# Patient Record
Sex: FEMALE | Race: WHITE | Employment: FULL TIME | URBAN - METROPOLITAN AREA
[De-identification: names, ages, dates, MRNs, and addresses within clinical notes are randomized per-mention and may not be internally consistent; named-entity substitution may affect disease eponyms.]

---

## 2017-02-12 RX ORDER — LEVOTHYROXINE SODIUM 0.15 MG/1
150 TABLET ORAL DAILY
Qty: 90 TABLET | Refills: 1 | Status: SHIPPED | OUTPATIENT
Start: 2017-02-12 | End: 2017-03-10 | Stop reason: SDUPTHER

## 2017-02-27 RX ORDER — METFORMIN HYDROCHLORIDE 500 MG/1
TABLET, EXTENDED RELEASE ORAL
Qty: 150 TABLET | Refills: 5 | Status: SHIPPED | OUTPATIENT
Start: 2017-02-27

## 2017-03-10 RX ORDER — LEVOTHYROXINE SODIUM 0.15 MG/1
150 TABLET ORAL DAILY
Qty: 90 TABLET | Refills: 3 | Status: SHIPPED | OUTPATIENT
Start: 2017-03-10

## 2017-03-10 RX ORDER — IRON POLYSACCHARIDE COMPLEX 180 MG
1 CAPSULE ORAL DAILY
Qty: 90 CAPSULE | Refills: 3 | Status: SHIPPED | OUTPATIENT
Start: 2017-03-10 | End: 2017-09-06

## 2017-03-10 RX ORDER — CELECOXIB 200 MG/1
200 CAPSULE ORAL 2 TIMES DAILY
Qty: 180 CAPSULE | Refills: 3 | Status: SHIPPED | OUTPATIENT
Start: 2017-03-10

## 2017-03-10 RX ORDER — ERGOCALCIFEROL 1.25 MG/1
CAPSULE ORAL
Qty: 12 CAPSULE | Refills: 3 | Status: SHIPPED | OUTPATIENT
Start: 2017-03-10

## 2017-06-16 ENCOUNTER — TELEPHONE (OUTPATIENT)
Dept: ENDOCRINOLOGY | Age: 47
End: 2017-06-16

## 2017-06-16 RX ORDER — METFORMIN HCL 100 %
POWDER (GRAM) MISCELLANEOUS
Qty: 180 G | Refills: 0 | Status: SHIPPED | OUTPATIENT
Start: 2017-06-16 | End: 2017-06-16 | Stop reason: ALTCHOICE

## 2017-09-05 ENCOUNTER — OFFICE VISIT (OUTPATIENT)
Dept: ORTHOPEDIC SURGERY | Age: 47
End: 2017-09-05

## 2017-09-05 VITALS
HEIGHT: 64 IN | HEART RATE: 65 BPM | DIASTOLIC BLOOD PRESSURE: 69 MMHG | BODY MASS INDEX: 28.17 KG/M2 | SYSTOLIC BLOOD PRESSURE: 115 MMHG | WEIGHT: 165 LBS

## 2017-09-05 DIAGNOSIS — M25.562 LEFT KNEE PAIN, UNSPECIFIED CHRONICITY: Primary | ICD-10-CM

## 2017-09-05 DIAGNOSIS — Z96.652 HISTORY OF TOTAL KNEE ARTHROPLASTY, LEFT: ICD-10-CM

## 2017-09-05 PROCEDURE — 99203 OFFICE O/P NEW LOW 30 MIN: CPT | Performed by: ORTHOPAEDIC SURGERY

## 2017-09-05 PROCEDURE — 73562 X-RAY EXAM OF KNEE 3: CPT | Performed by: ORTHOPAEDIC SURGERY

## 2017-09-08 ENCOUNTER — HOSPITAL ENCOUNTER (OUTPATIENT)
Dept: PHYSICAL THERAPY | Age: 47
Discharge: HOME | End: 2017-09-30
Admitting: ORTHOPAEDIC SURGERY

## 2017-09-08 NOTE — FLOWSHEET NOTE
[facility]  Orthopaedics and Sports Rehabilitation, Alexia Harper    Physical Therapy Daily Treatment Note  Date:  2017    Patient Name:  Henna Pate    :  1970  MRN: [id number]  Restrictions/Precautions:    Medical/Treatment Diagnosis Information:  Diagnosis: L knee pain//  ICD 10 Code: M25.562D  Treatment Diagnosis: Knee pain/ knee swelling/ limited ROM/ LE muscle weakness    Insurance/Certification information:  PT Insurance Information: PT BENEFITS 2017 FACILITY/ ANTHEM/ EFFECTIVE 7-1-15 ACTIVE/ DED 1500/ OOP 2500/ PAYS 85%/ 60 VPPY/ 0 AUTH REQ/ ANASTASIAY/ REF# [id number]/ 17 PAG  Physician Information:  Referring Practitioner: Nubia Troncoso MD  Plan of care signed (Y/N): Y    Date of Patient follow up with Physician: PRN    G-Code (if applicable):      Date G-Code Applied:  17  PT G-Codes  Functional Assessment Tool Used: LEFS  Score: 57/ 80= 71%  Functional Limitation: Mobility: Walking and moving around  Mobility: Walking and Moving Around Current Status (): At least 20 percent but less than 40 percent impaired, limited or restricted  Mobility: Walking and Moving Around Goal Status ():  At least 1 percent but less than 20 percent impaired, limited or restricted    Progress Note: [x]  Yes  []  No  Next due by: Visit #10       Latex Allergy:  [x]NO      []YES  Preferred Language for Healthcare:   [x]English       []other:    Visit # Insurance Allowable   1 60     Pain Scale: 0-8 /10  Easing factors: Rest; ice; meds; light exercise  Provocative factors: Walking down stairs/ declines; kneeling  Type: []Constant                     [x]Intermittent                []Radiating                   []Localized                   []other:     SUBJECTIVE: Unique Gama is a 50 y.o. female who presents for follow up of left knee.  The patient did undergo left total knee arthroplasty on 2006.  She reports she's been doing well after the knee replacement until about 6 months ago. Nadia Alamo began to notice some mild pressure-like discomfort primarily over the anterior aspect of the left knee that occurs when she is walking downhill or down an incline or down a set of stairs.  She reports it does not occur every time but when it does occur is mainly during those activities.  She reports that as long as she does not walk down the hill she does not notice any change with her left knee.  She denies any pain at rest.  She denies any instability, buckling, loosening or catching of the left knee.  She denies any new injuries to the left knee.  She denies any fevers or chills.  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 Due to giving way issues, Dr. Mabel Landaverde recommended a PT visit to complete an assessment/ Biodex strength eval/ updated HEP due to expected issues related to LE muscle weakness.         OBJECTIVE:       LEFS Score: 57/ 80= 71% (9/8/17;  Initial)      9-8-17            ROM PROM AROM Overpressure Comment     L R L R L R     Flexion 140 148             Extension +1 +3                                                    9-8-17  Strength L R Comment   Quad 4/5 4+/5     Hamstring         Gastroc         Hip  flexion 4-/5 4/5     Hip abd 4-/5 4/5                            9-8-17  Special Test Results/Comment   Meniscal Click     Crepitus R knee (+) 1-2/3; 45-0°   Flexion Test     Manjuns     PF (+) lateral tilt  (+) J sign, mild  (+) SLR lag      9-8-17  Girth L R   Mid Patella 41.3 40.9   Suprapatellar 43.0 42.1   5cm above 48.3 46.6   15cm above          Reflexes/Sensation:                         [x]Dermatomes/Myotomes intact                         [x]Reflexes equal and normal bilaterally                        []Other:     Joint mobility:                          [x]Normal                                   []Hypo                        []Hyper     Palpation: Non tender     Functional Mobility/Transfers: Independent     Posture: posture and demonstrate good body mechanics with sitting, bending, and lifting                        []Reduced ability to sleep                        [] Reduced ability or tolerance with driving and/or computer work                        []Reduced ability to perform lifting, carrying tasks                        [x]Reduced ability to squat                        []Reduced ability to forward bend                        [x]Reduced ability to ambulate prolonged functional periods/distances/surfaces                        [x]Reduced ability to ascend/descend stairs                        [x]Reduced ability to run, hop or jump                        []other:      Participation Restrictions                        []Reduced participation in self care activities                        []Reduced participation in home management activities                        [x]Reduced participation in work activities                        [x]Reduced participation in social activities. [x]Reduced participation in sport activities.     Classification:                         [x]Signs/symptoms consistent with post-surgical status including decreased ROM, strength and function.                         []Signs/symptoms consistent with joint sprain/strain                        []Signs/symptoms consistent with patella-femoral syndrome                        [x]Signs/symptoms consistent with knee OA/hip OA                        []Signs/symptoms consistent with internal derangement of knee/Hip                        [x]Signs/symptoms consistent with functional hip weakness/NMR control                                         []Signs/symptoms consistent with tendinitis/tendinosis                                             []signs/symptoms consistent with pathology which may benefit from Dry needling                                      []other:       Prognosis/Rehab Potential:

## 2017-09-08 NOTE — PLAN OF CARE
incline or down a set of stairs. She reports it does not occur every time but when it does occur is mainly during those activities. She reports that as long as she does not walk down the hill she does not notice any change with her left knee. She denies any pain at rest.  She denies any instability, buckling, loosening or catching of the left knee. She denies any new injuries to the left knee. She denies any fevers or chills. 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 Due to giving way issues, Dr. Nilesh Chance recommended a PT visit to complete an assessment/ Biodex strength eval/ updated HEP due to expected issues related to LE muscle weakness. Relevant Medical History: Denies history for general medical issues/ previous history for L knee orthopaedic surgery  Functional Disability Index:PT G-Codes  Functional Assessment Tool Used: LEFS  Score: 57/ 80= 71%  Functional Limitation: Mobility: Walking and moving around  Mobility: Walking and Moving Around Current Status (): At least 20 percent but less than 40 percent impaired, limited or restricted  Mobility: Walking and Moving Around Goal Status (): At least 1 percent but less than 20 percent impaired, limited or restricted    Pain Scale: 0-8 /10  Easing factors: Rest; ice; meds; light exercise  Provocative factors: Walking down stairs/ declines; kneeling    Type: []Constant   [x]Intermittent  []Radiating []Localized []other:     Numbness/Tingling: None    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[address] Level of Function: Independent with ADLs and IADLs, fitness training including, but not limited to weight training; elliptical; cycling    OBJECTIVE:      LEFS Score: 57/ 80= 71% (9/8/17;  Initial)     9-8-17  ROM PROM AROM Overpressure Comment    L R L R L R    Flexion 140 148        Extension +1 +3                              9-8-17  Strength L R Comment   Quad 4/5 4+/5    Hamstring Gastroc      Hip  flexion 4-/5 4/5    Hip abd 4-/5 4/5                  9-8-17  Special Test Results/Comment   Meniscal Click    Crepitus R knee (+) 1-2/3; 45-0°   Flexion Test    Homans    PF (+) lateral tilt  (+) J sign, mild  (+) SLR lag     9-8-17  Girth L R   Mid Patella 41.3 40.9   Suprapatellar 43.0 42.1   5cm above 48.3 46.6   15cm above       Reflexes/Sensation:    [x]Dermatomes/Myotomes intact    [x]Reflexes equal and normal bilaterally   []Other:    Joint mobility:     [x]Normal    []Hypo   []Hyper    Palpation: Non tender    Functional Mobility/Transfers: Independent    Posture: Neutral    Bandages/Dressings/Incisions: Previous healed    Gait: WFL                         [x] Patient history, allergies, meds reviewed. Medical chart reviewed. See intake form. Review Of Systems (ROS):  [x]Performed Review of systems (Integumentary, CardioPulmonary, Neurological) by intake and observation. Intake form has been scanned into medical record. Patient has been instructed to contact their primary care physician regarding ROS issues if not already being addressed at this time.       Co-morbidities/Complexities (which will affect course of rehabilitation):   []None           Arthritic conditions   []Rheumatoid arthritis (M05.9)  []Osteoarthritis (M19.91)   Cardiovascular conditions   []Hypertension (I10)  []Hyperlipidemia (E78.5)  []Angina pectoris (I20)  []Atherosclerosis (I70)   Musculoskeletal conditions   []Disc pathology   []Congenital spine pathologies   [x]Prior surgical intervention  []Osteoporosis (M81.8)  []Osteopenia (M85.8)   Endocrine conditions   []Hypothyroid (E03.9)  []Hyperthyroid Gastrointestinal conditions   []Constipation (J48.98)   Metabolic conditions   []Morbid obesity (E66.01)  []Diabetes type 1(E10.65) or 2 (E11.65)   []Neuropathy (G60.9)     Pulmonary conditions   []Asthma (J45)  []Coughing   []COPD (J44.9)   Psychological Disorders  []Anxiety (F41.9)  []Depression (F32.9) []Other:   []Other:          Barriers to/and or personal factors that will affect rehab potential:              []Age  []Sex              []Motivation/Lack of Motivation                        [x]Co-Morbidities              []Cognitive Function, education/learning barriers              []Environmental, home barriers              []profession/work barriers  []past PT/medical experience  []other:  Justification: LE muscle weakness; program compliance all determine program progression    Falls Risk Assessment (30 days):   [x] Falls Risk assessed and no intervention required.   [] Falls Risk assessed and Patient requires intervention due to being higher risk   TUG score (>12s at risk):     [] Falls education provided, including       ASSESSMENT: *  Functional Impairments:     []Noted lumbar/proximal hip/LE hypomobility   [x]Decreased LE functional ROM   [x]Decreased core/proximal hip strength and neuromuscular control   [x]Decreased LE functional strength   [x]Reduced balance/proprioceptive control   []other:      Functional Activity Limitations (from functional questionnaire and intake)   []Reduced ability to tolerate prolonged functional positions   []Reduced ability or difficulty with changes of positions or transfers between positions   []Reduced ability to maintain good posture and demonstrate good body mechanics with sitting, bending, and lifting   []Reduced ability to sleep   [] Reduced ability or tolerance with driving and/or computer work   []Reduced ability to perform lifting, carrying tasks   [x]Reduced ability to squat   []Reduced ability to forward bend   [x]Reduced ability to ambulate prolonged functional periods/distances/surfaces   [x]Reduced ability to ascend/descend stairs   [x]Reduced ability to run, hop or jump   []other:     Participation Restrictions   []Reduced participation in self care activities   []Reduced participation in home management activities   [x]Reduced participation in work activities   [x]Reduced participation in social activities. [x]Reduced participation in sport activities. Classification :    [x]Signs/symptoms consistent with post-surgical status including decreased ROM, strength and function. []Signs/symptoms consistent with joint sprain/strain   []Signs/symptoms consistent with patella-femoral syndrome   [x]Signs/symptoms consistent with knee OA/hip OA   []Signs/symptoms consistent with internal derangement of knee/Hip   [x]Signs/symptoms consistent with functional hip weakness/NMR control      []Signs/symptoms consistent with tendinitis/tendinosis    []signs/symptoms consistent with pathology which may benefit from Dry needling      []other:      Prognosis/Rehab Potential:      []Excellent   [x]Good    []Fair   []Poor    Tolerance of evaluation/treatment:    []Excellent   [x]Good: Good tolerance to initial exam/ Biodex test/ initial exercise program.  Functional ADL deficits include, but not limited to LE muscle weakness/ decreased ROM/ knee pain. Good understanding of initial HEP/ HTP/ OA precautions.     []Fair   []Poor    Physical Therapy Evaluation Complexity Justification  [x] A history of present problem with:  [] no personal factors and/or comorbidities that impact the plan of care;  [x]1-2 personal factors and/or comorbidities that impact the plan of care  []3 personal factors and/or comorbidities that impact the plan of care  [x] An examination of body systems using standardized tests and measures addressing any of the following: body structures and functions (impairments), activity limitations, and/or participation restrictions;:  [] a total of 1-2 or more elements   [x] a total of 3 or more elements   [] a total of 4 or more elements   [x] A clinical presentation with:  [] stable and/or uncomplicated characteristics   [x] evolving clinical presentation with changing characteristics  [] unstable and unpredictable characteristics;   [x] Clinical decision making Biodex bilateral/ TQBW/ HQ ratio performance. 4. Patient will return to 80%> functional activities without increased symptoms or restriction.    5. Resume all ADL activity without knee pain       Electronically signed by:  Liliana Vera PT

## 2017-10-20 LAB
A/G RATIO: 2 (ref 1.1–2.2)
ALBUMIN SERPL-MCNC: 4.5 G/DL (ref 3.4–5)
ALP BLD-CCNC: 62 U/L (ref 40–129)
ALT SERPL-CCNC: 12 U/L (ref 10–40)
ANION GAP SERPL CALCULATED.3IONS-SCNC: 14 MMOL/L (ref 3–16)
AST SERPL-CCNC: 20 U/L (ref 15–37)
BASOPHILS ABSOLUTE: 0.1 K/UL (ref 0–0.2)
BASOPHILS RELATIVE PERCENT: 1.2 %
BILIRUB SERPL-MCNC: <0.2 MG/DL (ref 0–1)
BUN BLDV-MCNC: 7 MG/DL (ref 7–20)
CALCIUM SERPL-MCNC: 9.2 MG/DL (ref 8.3–10.6)
CHLORIDE BLD-SCNC: 104 MMOL/L (ref 99–110)
CHOLESTEROL, TOTAL: 176 MG/DL (ref 0–199)
CO2: 25 MMOL/L (ref 21–32)
CREAT SERPL-MCNC: 0.6 MG/DL (ref 0.6–1.1)
EOSINOPHILS ABSOLUTE: 0.2 K/UL (ref 0–0.6)
EOSINOPHILS RELATIVE PERCENT: 2.8 %
ESTRADIOL LEVEL: 15 PG/ML
FERRITIN: 29.8 NG/ML (ref 15–150)
FOLLICLE STIMULATING HORMONE: 11.2 MIU/ML
GFR AFRICAN AMERICAN: >60
GFR NON-AFRICAN AMERICAN: >60
GLOBULIN: 2.2 G/DL
GLUCOSE BLD-MCNC: 84 MG/DL (ref 70–99)
HCT VFR BLD CALC: 40.8 % (ref 36–48)
HDLC SERPL-MCNC: 85 MG/DL (ref 40–60)
HEMOGLOBIN: 13.6 G/DL (ref 12–16)
HOMOCYSTEINE: 7 UMOL/L (ref 0–10)
IRON SATURATION: 13 % (ref 15–50)
IRON: 43 UG/DL (ref 37–145)
LDL CHOLESTEROL CALCULATED: 81 MG/DL
LUTEINIZING HORMONE: 8.9 MIU/ML
LYMPHOCYTES ABSOLUTE: 1.5 K/UL (ref 1–5.1)
LYMPHOCYTES RELATIVE PERCENT: 25.6 %
MCH RBC QN AUTO: 32.3 PG (ref 26–34)
MCHC RBC AUTO-ENTMCNC: 33.4 G/DL (ref 31–36)
MCV RBC AUTO: 96.8 FL (ref 80–100)
MONOCYTES ABSOLUTE: 0.5 K/UL (ref 0–1.3)
MONOCYTES RELATIVE PERCENT: 9.2 %
NEUTROPHILS ABSOLUTE: 3.6 K/UL (ref 1.7–7.7)
NEUTROPHILS RELATIVE PERCENT: 61.2 %
PDW BLD-RTO: 14.3 % (ref 12.4–15.4)
PLATELET # BLD: 241 K/UL (ref 135–450)
PMV BLD AUTO: 8.9 FL (ref 5–10.5)
POTASSIUM SERPL-SCNC: 4.5 MMOL/L (ref 3.5–5.1)
PROGESTERONE LEVEL: 0.19 NG/ML
RBC # BLD: 4.22 M/UL (ref 4–5.2)
SODIUM BLD-SCNC: 143 MMOL/L (ref 136–145)
TOTAL IRON BINDING CAPACITY: 322 UG/DL (ref 260–445)
TOTAL PROTEIN: 6.7 G/DL (ref 6.4–8.2)
TRIGL SERPL-MCNC: 48 MG/DL (ref 0–150)
VITAMIN D 25-HYDROXY: 38.6 NG/ML
VLDLC SERPL CALC-MCNC: 10 MG/DL
WBC # BLD: 5.8 K/UL (ref 4–11)

## 2017-10-25 LAB
SEX HORMONE BINDING GLOBULIN: 96 NMOL/L (ref 30–135)
TESTOSTERONE FREE-NONMALE: 1.8 PG/ML (ref 1.1–5.8)
TESTOSTERONE TOTAL: 21 NG/DL (ref 20–70)

## 2018-05-30 ENCOUNTER — TELEPHONE (OUTPATIENT)
Dept: ORTHOPEDIC SURGERY | Age: 48
End: 2018-05-30